# Patient Record
Sex: MALE | Race: WHITE | NOT HISPANIC OR LATINO | URBAN - METROPOLITAN AREA
[De-identification: names, ages, dates, MRNs, and addresses within clinical notes are randomized per-mention and may not be internally consistent; named-entity substitution may affect disease eponyms.]

---

## 2017-01-30 ENCOUNTER — TELEPHONE (OUTPATIENT)
Dept: FAMILY MEDICINE | Facility: CLINIC | Age: 47
End: 2017-01-30

## 2017-01-30 NOTE — TELEPHONE ENCOUNTER
Patient has not been seen since 5/2015.  Patient stated that he got Ativan last time and it worked .  Advised he has not been seen on over one year and he should make an appointment.  Appointment made with Joy Cancino for tomorrow.  Patient stated understanding and agreeable with the plan of care. Cindy Valverde RN CPC Triage.

## 2017-01-30 NOTE — TELEPHONE ENCOUNTER
Reason for Call:  Medication or medication refill:    Do you use a [facility]?  Name of the pharmacy and phone number for the current request:  [id number] - [facility] [address]    Name of the medication requested: Would like some medication for traveling on airplane to [place] on Thursday 2-2-17.    Other request: no    Can we leave a detailed message on this number? YES    Phone number patient can be reached at: Home number on file [phone] (home)    Best Time: As soon as possible    Call taken on 1/30/2017 at 1:57 PM by Eugenie Iglesias

## 2017-01-31 ENCOUNTER — OFFICE VISIT (OUTPATIENT)
Dept: INTERNAL MEDICINE | Facility: CLINIC | Age: 47
End: 2017-01-31
Payer: COMMERCIAL

## 2017-01-31 VITALS
WEIGHT: 209 LBS | BODY MASS INDEX: 29.26 KG/M2 | OXYGEN SATURATION: 98 % | HEIGHT: 71 IN | SYSTOLIC BLOOD PRESSURE: 131 MMHG | DIASTOLIC BLOOD PRESSURE: 86 MMHG | HEART RATE: 49 BPM

## 2017-01-31 DIAGNOSIS — F41.8 SITUATIONAL ANXIETY: ICD-10-CM

## 2017-01-31 DIAGNOSIS — Z23 NEED FOR PROPHYLACTIC VACCINATION AND INOCULATION AGAINST INFLUENZA: Primary | ICD-10-CM

## 2017-01-31 DIAGNOSIS — E03.9 HYPOTHYROIDISM, UNSPECIFIED TYPE: ICD-10-CM

## 2017-01-31 DIAGNOSIS — F40.243 PHOBIA, FLYING: ICD-10-CM

## 2017-01-31 PROCEDURE — 36415 COLL VENOUS BLD VENIPUNCTURE: CPT | Performed by: NURSE PRACTITIONER

## 2017-01-31 PROCEDURE — 99214 OFFICE O/P EST MOD 30 MIN: CPT | Mod: 25 | Performed by: NURSE PRACTITIONER

## 2017-01-31 PROCEDURE — 84443 ASSAY THYROID STIM HORMONE: CPT | Performed by: NURSE PRACTITIONER

## 2017-01-31 PROCEDURE — 90686 IIV4 VACC NO PRSV 0.5 ML IM: CPT | Performed by: NURSE PRACTITIONER

## 2017-01-31 PROCEDURE — 84439 ASSAY OF FREE THYROXINE: CPT | Performed by: NURSE PRACTITIONER

## 2017-01-31 PROCEDURE — 90471 IMMUNIZATION ADMIN: CPT | Performed by: NURSE PRACTITIONER

## 2017-01-31 RX ORDER — LORAZEPAM 0.5 MG/1
.5-1 TABLET ORAL EVERY 8 HOURS PRN
Qty: 8 TABLET | Refills: 0 | Status: CANCELLED | OUTPATIENT
Start: 2017-01-31

## 2017-01-31 RX ORDER — LEVOTHYROXINE SODIUM 75 UG/1
75 TABLET ORAL DAILY
Qty: 90 TABLET | Refills: 3 | Status: SHIPPED
Start: 2017-01-31 | End: 2018-03-20

## 2017-01-31 RX ORDER — LORAZEPAM 0.5 MG/1
0.25-0.5 TABLET ORAL EVERY 8 HOURS PRN
Qty: 8 TABLET | Refills: 0 | Status: SHIPPED | OUTPATIENT
Start: 2017-01-31 | End: 2019-11-01

## 2017-01-31 NOTE — PROGRESS NOTES
SUBJECTIVE:                                                    Walter Franklin is a 46 year old male who presents to clinic today for the following health issues:      Concern - Ativan for flying to [place]      Onset: N/A     Description:   Would like a prescription of Ativan due to flying to [facility] for [place] work    Intensity: mild, moderate    Progression of Symptoms:  same    Accompanying Signs & Symptoms:  None       Previous history of similar problem:   Had a small prescription in the past for flying and it seemed to work well    Precipitating factors:   Worsened by: n/a    Alleviating factors:  Improved by: n/a       Therapies Tried and outcome: Ativan     Going to [facility] for mission trip  Used ativan in past for flying  Requests refill  Tolerated in past      Ran out of synthroid 1 month ago  Was stable on 75 mcg for several years  Denies symptoms  Although states he never had symptoms in the past except for fatigue      Problem list and histories reviewed & adjusted, as indicated.  Additional history: none    Patient Active Problem List   Diagnosis     Hypothyroidism     CARDIOVASCULAR SCREENING; LDL GOAL LESS THAN 160     Right hip pain     Past Surgical History   Procedure Laterality Date     Vasectomy         Social History   Substance Use Topics     Smoking status: Never Smoker      Smokeless tobacco: Never Used     Alcohol Use: No     Family History   Problem Relation Age of Onset     Arthritis Father      CANCER Maternal Grandfather      CANCER Paternal Grandmother      Hypertension Father      Prostate Cancer Maternal Grandfather      Thyroid Disease Mother      Thyroid Disease Sister      Thyroid Disease Maternal Grandmother          Current Outpatient Prescriptions   Medication Sig Dispense Refill     LORazepam (ATIVAN) 0.5 MG tablet Take 0.5-1 tablets (0.25-0.5 mg) by mouth every 8 hours as needed for anxiety Take 30 minutes prior to departure.  Do not operate a vehicle after taking  "this medication 8 tablet 0     levothyroxine (SYNTHROID/LEVOTHROID) 75 MCG tablet Take 1 tablet (75 mcg) by mouth daily 90 tablet 3     [DISCONTINUED] levothyroxine (SYNTHROID) 75 MCG tablet Take 1 tablet (75 mcg) by mouth daily (NEEDS TO BE SEEN W/LABS FOR REFILLS) 30 tablet 0     BP Readings from Last 3 Encounters:   01/31/17 131/86   05/29/15 122/81   10/10/13 118/74    Wt Readings from Last 3 Encounters:   01/31/17 209 lb (94.802 kg)   05/29/15 205 lb (92.987 kg)   10/10/13 198 lb (89.812 kg)                  Problem list, Medication list, Allergies, and Medical/Social/Surgical histories reviewed in EPIC and updated as appropriate.    ROS:  Noncontributory except as above    OBJECTIVE:                                                    /86 mmHg  Pulse 49  Ht 5' 11.38\" (1.813 m)  Wt 209 lb (94.802 kg)  BMI 28.84 kg/m2  SpO2 98%  Body mass index is 28.84 kg/(m^2).  GENERAL: healthy, alert and no distress  NECK: no adenopathy  RESP: lungs clear to auscultation - no rales, rhonchi or wheezes  CV: regular rate and rhythm, normal S1 S2, no S3 or S4, no murmur, click or rub  PSYCH: mentation appears normal, affect normal/bright    Diagnostic Test Results:  none      ASSESSMENT/PLAN:                                                        ICD-10-CM    1. Need for prophylactic vaccination and inoculation against influenza Z23 FLU VAC, SPLIT VIRUS IM > 3 YO (QUADRIVALENT) [38322]     Vaccine Administration, Initial [33837]   2. Situational anxiety F41.8    3. Phobia, flying F40.243 LORazepam (ATIVAN) 0.5 MG tablet   4. Hypothyroidism, unspecified type E03.9 TSH with free T4 reflex     levothyroxine (SYNTHROID/LEVOTHROID) 75 MCG tablet       Except TSH to be high given 1 month without replacement, but will check  Restart 75 mcg  Patient aware of potential side effects of ativan. He has tolerated in the past.     ZACHARY Burden [facility]    Injectable Influenza Immunization " Documentation    1.  Is the person to be vaccinated sick today?  No    2. Does the person to be vaccinated have an allergy to eggs or to a component of the vaccine?  No    3. Has the person to be vaccinated today ever had a serious reaction to influenza vaccine in the past?  No    4. Has the person to be vaccinated ever had Guillain-Huntington syndrome?  No     Form completed by Grecia Ceron CMA

## 2017-01-31 NOTE — NURSING NOTE
"Chief Complaint   Patient presents with     Medication Request     Health Maintenance     flu       Initial /86 mmHg  Pulse 49  Ht 5' 11.38\" (1.813 m)  Wt 209 lb (94.802 kg)  BMI 28.84 kg/m2  SpO2 98% Estimated body mass index is 28.84 kg/(m^2) as calculated from the following:    Height as of this encounter: 5' 11.38\" (1.813 m).    Weight as of this encounter: 209 lb (94.802 kg).  BP completed using cuff size: mino Ceron CMA       "

## 2017-01-31 NOTE — MR AVS SNAPSHOT
"              After Visit Summary   1/31/2017    Walter Franklin    MRN: [id number]           Patient Information     Date Of Birth          1970        Visit Information        Provider Department      1/31/2017 5:20 PM Joy Cancino APRN CNP [facility]        Today's Diagnoses     Need for prophylactic vaccination and inoculation against influenza    -  1     Situational anxiety         Phobia, flying            Follow-ups after your visit        Who to contact     If you have questions or need follow up information about today's clinic visit or your schedule please contact [facility] directly at [phone].  Normal or non-critical lab and imaging results will be communicated to you by MyChart, letter or phone within 4 business days after the clinic has received the results. If you do not hear from us within 7 days, please contact the clinic through [facility] or phone. If you have a critical or abnormal lab result, we will notify you by phone as soon as possible.  Submit refill requests through [facility] or call your pharmacy and they will forward the refill request to us. Please allow 3 business days for your refill to be completed.          Additional Information About Your Visit        MyChart Information     [facility] gives you secure access to your electronic health record. If you see a primary care provider, you can also send messages to your care team and make appointments. If you have questions, please call your primary care clinic.  If you do not have a primary care provider, please call [phone] and they will assist you.        Care EveryWhere ID     This is your Care EveryWhere ID. This could be used by other organizations to access your [place] medical records  [id number]        Your Vitals Were     Pulse Height BMI (Body Mass Index) Pulse Oximetry          49 5' 11.38\" (1.813 m) 28.84 kg/m2 98%         Blood Pressure from Last 3 Encounters: "   01/31/17 131/86   05/29/15 122/81   10/10/13 118/74    Weight from Last 3 Encounters:   01/31/17 209 lb (94.802 kg)   05/29/15 205 lb (92.987 kg)   10/10/13 198 lb (89.812 kg)              We Performed the Following     FLU VAC, SPLIT VIRUS IM > 3 YO (QUADRIVALENT) [68201]     Vaccine Administration, Initial [81227]          Today's Medication Changes          These changes are accurate as of: 1/31/17  5:29 PM.  If you have any questions, ask your nurse or doctor.               Start taking these medicines.        Dose/Directions    LORazepam 0.5 MG tablet   Commonly known as:  ATIVAN   Used for:  Phobia, flying   Started by:  Joy Cancino APRN CNP        Dose:  0.25-0.5 mg   Take 0.5-1 tablets (0.25-0.5 mg) by mouth every 8 hours as needed for anxiety Take 30 minutes prior to departure.  Do not operate a vehicle after taking this medication   Quantity:  8 tablet   Refills:  0            Where to get your medicines      Some of these will need a paper prescription and others can be bought over the counter.  Ask your nurse if you have questions.     Bring a paper prescription for each of these medications    - LORazepam 0.5 MG tablet             Primary Care Provider Office Phone # Fax #    Ryne Dawkins [phone]       [facility] [address] [facility] [address]        Thank you!     Thank you for choosing [facility]  for your care. Our goal is always to provide you with excellent care. Hearing back from our patients is one way we can continue to improve our services. Please take a few minutes to complete the written survey that you may receive in the mail after your visit with us. Thank you!             Your Updated Medication List - Protect others around you: Learn how to safely use, store and throw away your medicines at www.disposemymeds.org.          This list is accurate as of: 1/31/17  5:29 PM.  Always use your most recent med  list.                   Brand Name Dispense Instructions for use    levothyroxine 75 MCG tablet    SYNTHROID    30 tablet    Take 1 tablet (75 mcg) by mouth daily (NEEDS TO BE SEEN W/LABS FOR REFILLS)       LORazepam 0.5 MG tablet    ATIVAN    8 tablet    Take 0.5-1 tablets (0.25-0.5 mg) by mouth every 8 hours as needed for anxiety Take 30 minutes prior to departure.  Do not operate a vehicle after taking this medication

## 2017-02-01 LAB
T4 FREE SERPL-MCNC: 1.13 NG/DL (ref 0.76–1.46)
TSH SERPL DL<=0.005 MIU/L-ACNC: 5.91 MU/L (ref 0.4–4)

## 2017-02-01 NOTE — PROGRESS NOTES
This encounter has been reviewed.  The patient was not examined by me.  CRISS BERMAN M.D.   Supervising Physician in Internal Medicine, [place].

## 2017-02-02 NOTE — PROGRESS NOTES
Quick Note:    Walter,   Your lab results have been released to [facility].  Your TSH was elevated, while your T4 was stable. Your TSH should stabilize now that you've restarted the synthroid.  Joy Cancino CNP  ______

## 2017-02-10 PROBLEM — F41.8 SITUATIONAL ANXIETY: Status: ACTIVE | Noted: 2017-02-10

## 2017-03-08 ENCOUNTER — THERAPY VISIT (OUTPATIENT)
Dept: PHYSICAL THERAPY | Facility: CLINIC | Age: 47
End: 2017-03-08
Payer: COMMERCIAL

## 2017-03-08 DIAGNOSIS — M76.60 ACHILLES TENDON PAIN: Primary | ICD-10-CM

## 2017-03-08 PROCEDURE — 97530 THERAPEUTIC ACTIVITIES: CPT | Mod: GP | Performed by: PHYSICAL THERAPIST

## 2017-03-08 PROCEDURE — 97161 PT EVAL LOW COMPLEX 20 MIN: CPT | Mod: GP | Performed by: PHYSICAL THERAPIST

## 2017-03-08 PROCEDURE — 97110 THERAPEUTIC EXERCISES: CPT | Mod: GP | Performed by: PHYSICAL THERAPIST

## 2017-03-08 NOTE — PROGRESS NOTES
Subjective:    HPI                    Objective:    System    Physical Exam    [facility]  [place] [facility] Initial Evaluation - Lower Extremity    Evaluation Date: March 8, 2017  Walter Franklin is a 46 year old male with a Rt> Lt achills condition.   Referral: Podiatrist  Employment: [employer]   Employment status: normal hours  Work Mechanical stresses: sitting and computer  Leisure Mechanical stresses: basketball 3 x/week,   Functional disability from present episode: limiting basketball and running on heels, pain in AM  Visual Analog Score (VAS 0-10): 2/10 currently, 0-9/10    HISTORY:  Pt presents with:  Int Burning, throbbing, aching achilles insertion and mm tendon junction, Rt>>Lt  (Constant/Intermittent, Dull/achy/sharp/stabbing/throbbing and location/Radiation)  Associated symptoms (Numbness/tingling/weakness/swelling/clicking/locking/givingway/falling):  none  Onset of symptoms (date/how):  Exacerbation Sept/Oct 2016 - SILVIO for excaerbation  Symptoms Status (new/recurrent/chronic): recurrent and (improving/not changing/worsening): worsening  Condition occurred in the following environment: during recreation   Symptoms at onset: Int w/ activity and gradually is more painful w/ daily activity    (with consideration for bending, sitting/rising/first few steps, standing, walking, stairs, squatting/kneeling, am, as the day progresses, pm, when still, on the move, sleeping: prone, sup, side R/L, other )  Symptoms are made worse with:  Walking 1st steps in AM and after sitting 6-7/10, playing basketball, AM   Symptoms are made better with:  Ice ibuprofen    Continued use makes the pain: present but does not worsen, worse after done playing  Presence of pain at rest: Occas burning at rest   Site of pain at rest: achilles  Sleep disturbance: no   Previous episodes: On and off for 3-4 yrs w/ playing basketball  Previous treatments: none  Improvement with previous treatments: ice at home,  stretching and calf raises     Specific Questions: (as reported by the patient)  Do you have pain with coughing/sneezing: no  Do you experience parasthesia: no  Patient describes his/her general health as: good  Imaging/special testing: none  Recent or major surgery includes the following: none  Do you have night pain: none  Have you had any recent accidents: no  Have you experienced any unexplained weight loss: no  Pertinent medical history includes: Hypothyroid  Medical allergies include: nonef  Current medications: Thyroid  Barriers at home: none  Other red flags: none    Sites for physical examination: Rt> Lt achilles    OBJECTIVE EXAMINATION:    POSTURE:    Sitting: poor  Correction of Posture: NA  Standing Posture: slight pes planusRt  Gait:  Excessive pronation Rt    NEUROLOGICAL (motor/sensory/reflex/dural; if applicable): Dural (+) Rt achilles stretching slump>seated SLR    BASELINES: (pain or functional activity): tight Rt achilles w/ walking, slight pain Rt achilles bilat toe raise, Mod pain Rt achilles w/ single toe raise    EXTREMITIES: (Hip / Knee / Ankle / Foot):        AROM Rt/Pain   AROM Lt/Pain PROM  Rt/Pain  w/ w/o OP PROM  Lt/Pain  w/ w/o OP   Knee Flexion                Extension       Ankle Dorsiflexion Slight Decr/+  Slight decr              Plantarflexion Symmetrical/+                Inversion symmetrical                Eversion symmetrical        Resisted Test Response (pain): Bilat ankles strong and painfree     Rt   Pain   Lt   Pain             Toe Raise Bilat - prod Rt++, no pain Lt  Unilat Rt x10 +++, Lt no pain  Residual burnng after.        Other Tests:    TTP:  Rt - Moderate thickening mid achilles, +++pinching achilles, +achilles mm tendon junction             Lt - mild thickening mid achilles, ++pinching achilles, +achilles insertiona    SPINE:  NA    Baseline Symptoms: tight Rt w/ walking, + Rt bilat toe raise, +++Rt single toe raise  Repeated Tests Symptom Response Mechanical  Response   Active/Passive movement, resisted test, functional test During - Produce, Abolish, Increase, Decrease, NE After - Better, Worse, NB, NW, NE Effect - ? or ? ROM, strength or key functional test No Effect   Passive ankle DF w/ belt NE Better Less pain waking and bilat toe raise                         Effect of static positioning                  Provisional Classification: Articular Derangement  Extremity or Spine: Extremity  Principle of Management (education/equipment/mechanical therapy/specific principle): Passive DF w/ belt 20x 6 x/day    Assessment/Plan:      Patient is a 46 year old male with Rt>>Lt ankle complaints.    Patient has the following significant findings with corresponding treatment plan.                Diagnosis 1:  Achilles tendinitis Rt>>Lt  Pain -  hot/cold therapy, manual therapy, education, directional preference exercise and home program  Decreased ROM/flexibility - manual therapy, therapeutic exercise and home program  Decreased strength - therapeutic exercise, therapeutic activities and home program  Decreased function - therapeutic activities and home program    Therapy Evaluation Codes:   1) History comprised of:   Personal factors that impact the plan of care:      None.    Comorbidity factors that impact the plan of care are:      Thyroid.     Medications impacting care: Thyroid.  2) Examination of Body Systems comprised of:   Body structures and functions that impact the plan of care:      Ankle.   Activity limitations that impact the plan of care are:      Running, Sports and Walking.  3) Clinical presentation characteristics are:   Stable/Uncomplicated.  4) Decision-Making    Low complexity using standardized patient assessment instrument and/or measureable assessment of functional outcome.  Cumulative Therapy Evaluation is: Low complexity.    Previous and current functional limitations:  (See Goal Flow Sheet for this information)    Short term and Long term goals: (See  Goal Flow Sheet for this information)     Communication ability:  Patient appears to be able to clearly communicate and understand verbal and written communication and follow directions correctly.  Treatment Explanation - The following has been discussed with the patient:   RX ordered/plan of care  Anticipated outcomes  Possible risks and side effects  This patient would benefit from PT intervention to resume normal activities.   Rehab potential is good.    Frequency:  1 X week, once daily  Duration:  for 6-8 weeks  Discharge Plan:  Achieve all LTG.  Independent in home treatment program.  Reach maximal therapeutic benefit.    Please refer to the daily flowsheet for treatment today, total treatment time and time spent performing 1:1 timed codes.

## 2017-03-08 NOTE — LETTER
[facility] ATHLETIC [facility] PHYSICAL THERAPY  [address]  [facility] [address]  [phone]    March 10, 2017    Re: Walter Franklin   :   1970  MRN:  [id number]   REFERRING PHYSICIAN:   Julio Hein    [facility] ATHLETIC [facility] PHYSICAL THERAPY  Date of Initial Evaluation:  3/8/2017  Visits:  Rxs Used: 1  Reason for Referral:  Achilles tendon pain  [facility] Athletic [facility] Initial Evaluation - Lower Extremity  Evaluation Date: 2017  Walter Franklin is a 46 year old male with a Rt> Lt achills condition.   Referral: Podiatrist  Employment: [employer]   Employment status: normal hours  Work Mechanical stresses: sitting and computer  Leisure Mechanical stresses: basketball 3 x/week,   Functional disability from present episode: limiting basketball and running on heels, pain in AM  Visual Analog Score (VAS 0-10): 2/10 currently, 0-9/10    HISTORY:  Pt presents with:  Int Burning, throbbing, aching achilles insertion and mm tendon junction, Rt>>Lt  (Constant/Intermittent, Dull/achy/sharp/stabbing/throbbing and location/Radiation)  Associated symptoms (Numbness/tingling/weakness/swelling/clicking/locking/givingway/falling):  none  Onset of symptoms (date/how):  Exacerbation Sept/Oct 2016 - SILVIO for excaerbation  Symptoms Status (new/recurrent/chronic): recurrent and (improving/not changing/worsening): worsening  Condition occurred in the following environment: during recreation   Symptoms at onset: Int w/ activity and gradually is more painful w/ daily activity  (with consideration for bending, sitting/rising/first few steps, standing, walking, stairs, squatting/kneeling, am, as the day progresses, pm, when still, on the move, sleeping: prone, sup, side R/L, other )  Symptoms are made worse with:  Walking 1st steps in AM and after sitting 6-7/10, playing basketball, AM   Symptoms are made better with:  Ice ibuprofen  Continued use makes the  pain: present but does not worsen, worse after done playing  Presence of pain at rest: Occas burning at rest     Site of pain at rest: achilles        Re: Walter Goodmanjose   :   1970    HISTORY (continued)  Sleep disturbance: no   Previous episodes: On and off for 3-4 yrs w/ playing basketball  Previous treatments: none  Improvement with previous treatments: ice at home, stretching and calf raises     Specific Questions: (as reported by the patient)  Do you have pain with coughing/sneezing: no  Do you experience parasthesia: no  Patient describes his/her general health as: good  Imaging/special testing: none  Recent or major surgery includes the following: none  Do you have night pain: none  Have you had any recent accidents: no  Have you experienced any unexplained weight loss: no  Pertinent medical history includes: Hypothyroid  Medical allergies include: nonef  Current medications: Thyroid  Barriers at home: none  Other red flags: none    Sites for physical examination: Rt> Lt achilles    OBJECTIVE EXAMINATION:    POSTURE:  Sitting: poor  Correction of Posture: NA  Standing Posture: slight pes planusRt  Gait:  Excessive pronation Rt  NEUROLOGICAL (motor/sensory/reflex/dural; if applicable): Dural (+) Rt achilles stretching slump>seated SLR  BASELINES: (pain or functional activity): tight Rt achilles w/ walking, slight pain Rt achilles bilat toe raise, Mod pain Rt achilles w/ single toe raise    EXTREMITIES: (Hip / Knee / Ankle / Foot):        AROM Rt/Pain   AROM Lt/Pain PROM  Rt/Pain  w/ w/o OP PROM  Lt/Pain  w/ w/o OP   Knee Flexion                Extension       Ankle Dorsiflexion Slight Decr/+  Slight decr              Plantarflexion Symmetrical/+                Inversion symmetrical                Eversion symmetrical        Re: Walter Goodmantammy   :   1970    Resisted Test Response (pain): Bilat ankles strong and painfree     Rt   Pain   Lt   Pain             Toe Raise Bilat - prod Rt++,  no pain Lt  Unilat Rt x10 +++, Lt no pain  Residual burnng after.      Other Tests:    TTP:  Rt - Moderate thickening mid achilles, +++pinching achilles, +achilles mm tendon junction             Lt - mild thickening mid achilles, ++pinching achilles, +achilles insertiona  SPINE:  NA  Baseline Symptoms: tight Rt w/ walking, + Rt bilat toe raise, +++Rt single toe raise  Repeated Tests Symptom Response Mechanical Response   Active/Passive movement, resisted test, functional test During - Produce, Abolish, Increase, Decrease, NE After - Better, Worse, NB, NW, NE Effect - ? or ? ROM, strength or key functional test No Effect   Passive ankle DF w/ belt NE Better Less pain waking and bilat toe raise    Effect of static positioning       Provisional Classification: Articular Derangement    Extremity or Spine: Extremity  Principle of Management (education/equipment/mechanical therapy/specific principle): Passive DF w/ belt 20x 6 x/day    Assessment/Plan:    Patient is a 46 year old male with Rt>>Lt ankle complaints.    Patient has the following significant findings with corresponding treatment plan.                Diagnosis 1:  Achilles tendinitis Rt>>Lt  Pain -  hot/cold therapy, manual therapy, education, directional preference exercise and home program  Decreased ROM/flexibility - manual therapy, therapeutic exercise and home program  Decreased strength - therapeutic exercise, therapeutic activities and home program  Decreased function - therapeutic activities and home program                Re: Walter Franklin   :   1970    Therapy Evaluation Codes:   1) History comprised of:   Personal factors that impact the plan of care:      None.    Comorbidity factors that impact the plan of care are:      Thyroid.     Medications impacting care: Thyroid.  2) Examination of Body Systems comprised of:   Body structures and functions that impact the plan of care:      Ankle.   Activity limitations that impact the plan of  care are:      Running, Sports and Walking.  3) Clinical presentation characteristics are:   Stable/Uncomplicated.  4) Decision-Making    Low complexity using standardized patient assessment instrument and/or measureable assessment of functional outcome.  Cumulative Therapy Evaluation is: Low complexity.    Previous and current functional limitations:  (See Goal Flow Sheet for this information)    Short term and Long term goals: (See Goal Flow Sheet for this information)   Communication ability:  Patient appears to be able to clearly communicate and understand verbal and written communication and follow directions correctly.  Treatment Explanation - The following has been discussed with the patient:   RX ordered/plan of care, Anticipated outcomes, Possible risks and side effects    This patient would benefit from PT intervention to resume normal activities.   Rehab potential is good.  Frequency:  1 X week, once daily  Duration:  for 6-8 weeks  Discharge Plan:  Achieve all LTG.  Independent in home treatment program.  Reach maximal therapeutic benefit.    Thank you for your referral.    INQUIRIES        Therapist:  Pau Ramirez, PT, cert MDT  [facility]  [address]  [facility] [address]  Phone: [phone]  Fax: [phone]

## 2017-03-16 ENCOUNTER — THERAPY VISIT (OUTPATIENT)
Dept: PHYSICAL THERAPY | Facility: CLINIC | Age: 47
End: 2017-03-16
Payer: COMMERCIAL

## 2017-03-16 DIAGNOSIS — M76.60 ACHILLES TENDON PAIN: ICD-10-CM

## 2017-03-16 PROCEDURE — 97530 THERAPEUTIC ACTIVITIES: CPT | Mod: GP | Performed by: PHYSICAL THERAPIST

## 2017-03-16 PROCEDURE — 97110 THERAPEUTIC EXERCISES: CPT | Mod: GP | Performed by: PHYSICAL THERAPIST

## 2017-03-22 ENCOUNTER — THERAPY VISIT (OUTPATIENT)
Dept: PHYSICAL THERAPY | Facility: CLINIC | Age: 47
End: 2017-03-22
Payer: COMMERCIAL

## 2017-03-22 DIAGNOSIS — M76.60 ACHILLES TENDON PAIN: ICD-10-CM

## 2017-03-22 PROCEDURE — 97110 THERAPEUTIC EXERCISES: CPT | Mod: GP | Performed by: PHYSICAL THERAPIST

## 2017-03-22 PROCEDURE — 97530 THERAPEUTIC ACTIVITIES: CPT | Mod: GP | Performed by: PHYSICAL THERAPIST

## 2017-03-30 ENCOUNTER — THERAPY VISIT (OUTPATIENT)
Dept: PHYSICAL THERAPY | Facility: CLINIC | Age: 47
End: 2017-03-30
Payer: COMMERCIAL

## 2017-03-30 DIAGNOSIS — M76.60 ACHILLES TENDON PAIN: ICD-10-CM

## 2017-03-30 PROCEDURE — 97530 THERAPEUTIC ACTIVITIES: CPT | Mod: GP | Performed by: PHYSICAL THERAPIST

## 2017-03-30 PROCEDURE — 97110 THERAPEUTIC EXERCISES: CPT | Mod: GP | Performed by: PHYSICAL THERAPIST

## 2017-04-06 ENCOUNTER — THERAPY VISIT (OUTPATIENT)
Dept: PHYSICAL THERAPY | Facility: CLINIC | Age: 47
End: 2017-04-06
Payer: COMMERCIAL

## 2017-04-06 DIAGNOSIS — M76.60 ACHILLES TENDON PAIN: ICD-10-CM

## 2017-04-06 PROCEDURE — 97530 THERAPEUTIC ACTIVITIES: CPT | Mod: GP | Performed by: PHYSICAL THERAPIST

## 2017-04-06 PROCEDURE — 97140 MANUAL THERAPY 1/> REGIONS: CPT | Mod: GP | Performed by: PHYSICAL THERAPIST

## 2017-04-06 PROCEDURE — 97110 THERAPEUTIC EXERCISES: CPT | Mod: GP | Performed by: PHYSICAL THERAPIST

## 2017-04-19 ENCOUNTER — THERAPY VISIT (OUTPATIENT)
Dept: PHYSICAL THERAPY | Facility: CLINIC | Age: 47
End: 2017-04-19
Payer: COMMERCIAL

## 2017-04-19 DIAGNOSIS — M76.60 ACHILLES TENDON PAIN: ICD-10-CM

## 2017-04-19 PROCEDURE — 97140 MANUAL THERAPY 1/> REGIONS: CPT | Mod: GP | Performed by: PHYSICAL THERAPIST

## 2017-04-19 PROCEDURE — 97530 THERAPEUTIC ACTIVITIES: CPT | Mod: GP | Performed by: PHYSICAL THERAPIST

## 2017-04-19 PROCEDURE — 97110 THERAPEUTIC EXERCISES: CPT | Mod: GP | Performed by: PHYSICAL THERAPIST

## 2018-03-20 DIAGNOSIS — E03.9 HYPOTHYROIDISM, UNSPECIFIED TYPE: ICD-10-CM

## 2018-03-20 NOTE — TELEPHONE ENCOUNTER
"Requested Prescriptions   Pending Prescriptions Disp Refills     levothyroxine (SYNTHROID/LEVOTHROID) 75 MCG tablet 90 tablet 3    Last Written Prescription Date:  1-31-17  Last Fill Quantity: 90,  # refills: 3   Last office visit: 5/29/2015 with prescribing provider:     Future Office Visit:     Sig: Take 1 tablet (75 mcg) by mouth daily    Thyroid Protocol Failed    3/20/2018 12:39 PM       Failed - Recent (12 mo) or future (30 days) visit within the authorizing provider's specialty    Patient had office visit in the last 12 months or has a visit in the next 30 days with authorizing provider or within the authorizing provider's specialty.  See \"Patient Info\" tab in inbasket, or \"Choose Columns\" in Meds & Orders section of the refill encounter.           Failed - Normal TSH on file in past 12 months    Recent Labs   Lab Test  01/31/17   1738   TSH  5.91*             Passed - Patient is 12 years or older          "

## 2018-03-22 RX ORDER — LEVOTHYROXINE SODIUM 75 UG/1
75 TABLET ORAL DAILY
Qty: 30 TABLET | Refills: 0 | Status: SHIPPED | OUTPATIENT
Start: 2018-03-22 | End: 2018-04-30

## 2018-03-22 NOTE — TELEPHONE ENCOUNTER
Pharmacy called and is awaiting refill.    Routed to covering providers.  Cindy Valverde RN [facility] Triage.

## 2018-03-22 NOTE — TELEPHONE ENCOUNTER
Routing refill request to provider for review/approval because:  Failed protocol.  Cindy Valverde RN [facility] Triage.

## 2018-04-30 ENCOUNTER — TELEPHONE (OUTPATIENT)
Dept: FAMILY MEDICINE | Facility: CLINIC | Age: 48
End: 2018-04-30

## 2018-04-30 DIAGNOSIS — E03.9 HYPOTHYROIDISM, UNSPECIFIED TYPE: ICD-10-CM

## 2018-04-30 NOTE — TELEPHONE ENCOUNTER
"Requested Prescriptions   Pending Prescriptions Disp Refills     levothyroxine (SYNTHROID/LEVOTHROID) 75 MCG tablet [Pharmacy Med Name: LEVOTHYROXINE 75 MCG TABLET] 30 tablet 0    Last Written Prescription Date:  3-22-18  Last Fill Quantity: 30,  # refills: 0   Last office visit: 5/29/2015 with prescribing provider:     Future Office Visit:     Sig: TAKE 1 TABLET (75 MCG) BY MOUTH DAILY DUE FOR OV AND LAB RECHECK    Thyroid Protocol Failed    4/30/2018  7:21 AM       Failed - Recent (12 mo) or future (30 days) visit within the authorizing provider's specialty    Patient had office visit in the last 12 months or has a visit in the next 30 days with authorizing provider or within the authorizing provider's specialty.  See \"Patient Info\" tab in inbasket, or \"Choose Columns\" in Meds & Orders section of the refill encounter.           Failed - Normal TSH on file in past 12 months    Recent Labs   Lab Test  01/31/17   1738   TSH  5.91*             Passed - Patient is 12 years or older          "

## 2018-05-01 RX ORDER — LEVOTHYROXINE SODIUM 75 UG/1
TABLET ORAL
Qty: 7 TABLET | Refills: 0 | Status: SHIPPED | OUTPATIENT
Start: 2018-05-01 | End: 2018-05-08

## 2018-05-01 NOTE — TELEPHONE ENCOUNTER
Last OV 1/3/17.    TSH   Date Value Ref Range Status   01/31/2017 5.91 (H) 0.40 - 4.00 mU/L Final       Routing refill request to provider for review/approval because:  Pretty given x1 and patient did not follow up, please advise  Labs out of range  Labs not current      Cindy Valverde RN [facility] Triage.

## 2018-05-07 ENCOUNTER — OFFICE VISIT (OUTPATIENT)
Dept: FAMILY MEDICINE | Facility: CLINIC | Age: 48
End: 2018-05-07
Payer: COMMERCIAL

## 2018-05-07 VITALS
WEIGHT: 212 LBS | BODY MASS INDEX: 28.71 KG/M2 | HEART RATE: 82 BPM | HEIGHT: 72 IN | DIASTOLIC BLOOD PRESSURE: 84 MMHG | OXYGEN SATURATION: 97 % | SYSTOLIC BLOOD PRESSURE: 123 MMHG | TEMPERATURE: 97.8 F

## 2018-05-07 DIAGNOSIS — E03.8 OTHER SPECIFIED HYPOTHYROIDISM: ICD-10-CM

## 2018-05-07 DIAGNOSIS — M72.2 PLANTAR FASCIITIS: ICD-10-CM

## 2018-05-07 DIAGNOSIS — E78.5 HYPERLIPIDEMIA LDL GOAL <100: ICD-10-CM

## 2018-05-07 DIAGNOSIS — Z13.1 SCREENING FOR DIABETES MELLITUS: ICD-10-CM

## 2018-05-07 DIAGNOSIS — E03.9 HYPOTHYROIDISM, UNSPECIFIED TYPE: ICD-10-CM

## 2018-05-07 DIAGNOSIS — Z00.00 ROUTINE GENERAL MEDICAL EXAMINATION AT A HEALTH CARE FACILITY: Primary | ICD-10-CM

## 2018-05-07 LAB
GLUCOSE SERPL-MCNC: 98 MG/DL (ref 70–99)
TSH SERPL DL<=0.005 MIU/L-ACNC: 3.14 MU/L (ref 0.4–4)

## 2018-05-07 PROCEDURE — 84443 ASSAY THYROID STIM HORMONE: CPT | Performed by: FAMILY MEDICINE

## 2018-05-07 PROCEDURE — 82947 ASSAY GLUCOSE BLOOD QUANT: CPT | Performed by: FAMILY MEDICINE

## 2018-05-07 PROCEDURE — 36415 COLL VENOUS BLD VENIPUNCTURE: CPT | Performed by: FAMILY MEDICINE

## 2018-05-07 PROCEDURE — 99396 PREV VISIT EST AGE 40-64: CPT | Performed by: FAMILY MEDICINE

## 2018-05-07 NOTE — PROGRESS NOTES
SUBJECTIVE:   CC: Walter Franklin is an 47 year old male who presents for preventative health visit.     Physical   Annual:     Getting at least 3 servings of Calcium per day::  Yes    Bi-annual eye exam::  Yes    Dental care twice a year::  Yes    Sleep apnea or symptoms of sleep apnea::  None    Diet::  Regular (no restrictions)    Frequency of exercise::  1 day/week    Duration of exercise::  Less than 15 minutes    Taking medications regularly::  Yes    Medication side effects::  None    Additional concerns today::  YES              Current Outpatient Prescriptions   Medication Sig Dispense Refill     levothyroxine (SYNTHROID/LEVOTHROID) 75 MCG tablet TAKE 1 TABLET (75 MCG) BY MOUTH DAILY DUE FOR OV AND LAB RECHECK 7 tablet 0     LORazepam (ATIVAN) 0.5 MG tablet Take 0.5-1 tablets (0.25-0.5 mg) by mouth every 8 hours as needed for anxiety Take 30 minutes prior to departure.  Do not operate a vehicle after taking this medication (Patient not taking: Reported on 5/7/2018) 8 tablet 0     Mom has hyperlipidemia   Father had a CABG in 70's     Patient is not fasting today           Today's PHQ-2 Score:   PHQ-2 ( 1999 [employer]) 5/7/2018   Q1: Little interest or pleasure in doing things 0   Q2: Feeling down, depressed or hopeless 0   PHQ-2 Score 0   Q1: Little interest or pleasure in doing things Not at all   Q2: Feeling down, depressed or hopeless Not at all   PHQ-2 Score 0       Abuse: Current or Past(Physical, Sexual or Emotional)- No  Do you feel safe in your environment - Yes    Social History   Substance Use Topics     Smoking status: Never Smoker     Smokeless tobacco: Never Used     Alcohol use No     Alcohol Use 5/7/2018   If you drink alcohol do you typically have greater than 3 drinks per day OR greater than 7 drinks per week? No       Last PSA: No results found for: PSA    Reviewed orders with patient. Reviewed health maintenance and updated orders accordingly - Yes  BP Readings from Last 3 Encounters:    05/07/18 123/84   01/31/17 131/86   05/29/15 122/81    Wt Readings from Last 3 Encounters:   05/07/18 212 lb (96.2 kg)   01/31/17 209 lb (94.8 kg)   05/29/15 205 lb (93 kg)                  Patient Active Problem List   Diagnosis     Hypothyroidism     CARDIOVASCULAR SCREENING; LDL GOAL LESS THAN 160     Right hip pain     Situational anxiety     Achilles tendon pain     Past Surgical History:   Procedure Laterality Date     VASECTOMY         Social History   Substance Use Topics     Smoking status: Never Smoker     Smokeless tobacco: Never Used     Alcohol use No     Family History   Problem Relation Age of Onset     Thyroid Disease Mother      Arthritis Father      Hypertension Father      Thyroid Disease Maternal Grandmother      CANCER Maternal Grandfather      Prostate Cancer Maternal Grandfather      CANCER Paternal Grandmother      Thyroid Disease Sister          Current Outpatient Prescriptions   Medication Sig Dispense Refill     levothyroxine (SYNTHROID/LEVOTHROID) 75 MCG tablet TAKE 1 TABLET (75 MCG) BY MOUTH DAILY DUE FOR OV AND LAB RECHECK 7 tablet 0     LORazepam (ATIVAN) 0.5 MG tablet Take 0.5-1 tablets (0.25-0.5 mg) by mouth every 8 hours as needed for anxiety Take 30 minutes prior to departure.  Do not operate a vehicle after taking this medication (Patient not taking: Reported on 5/7/2018) 8 tablet 0     No Known Allergies    Reviewed and updated as needed this visit by clinical staff         Reviewed and updated as needed this visit by Provider          Works as a      Review of Systems  C: NEGATIVE for fever, chills, change in weight  I: NEGATIVE for worrisome rashes, moles or lesions  E: NEGATIVE for vision changes or irritation  ENT: NEGATIVE for ear, mouth and throat problems  R: NEGATIVE for significant cough or SOB  CV: NEGATIVE for chest pain, palpitations or peripheral edema  GI: NEGATIVE for nausea, abdominal pain, heartburn, or change in bowel habits   male: negative for  "dysuria, hematuria, decreased urinary stream, erectile dysfunction, urethral discharge  M: NEGATIVE for significant arthralgias or myalgia  N: NEGATIVE for weakness, dizziness or paresthesias  P: NEGATIVE for changes in mood or affect    OBJECTIVE:   /84 (BP Location: Left arm, Patient Position: Sitting, Cuff Size: Adult Large)  Pulse 82  Temp 97.8  F (36.6  C) (Oral)  Ht 5' 11.5\" (1.816 m)  Wt 212 lb (96.2 kg)  SpO2 97%  BMI 29.16 kg/m2    Physical Exam  GENERAL: healthy, alert and no distress  EYES: Eyes grossly normal to inspection, PERRL and conjunctivae and sclerae normal  HENT: ear canals and TM's normal, nose and mouth without ulcers or lesions  NECK: no adenopathy, no asymmetry, masses, or scars and thyroid normal to palpation  RESP: lungs clear to auscultation - no rales, rhonchi or wheezes  CV: regular rate and rhythm, normal S1 S2, no S3 or S4, no murmur, click or rub, no peripheral edema and peripheral pulses strong  ABDOMEN: soft, nontender, no hepatosplenomegaly, no masses and bowel sounds normal  MS: no gross musculoskeletal defects noted, no edema  Heel pain right foot   SKIN: no suspicious lesions or rashes  NEURO: Normal strength and tone, mentation intact and speech normal  PSYCH: mentation appears normal, affect normal/bright    ASSESSMENT/PLAN:       ICD-10-CM    1. Routine general medical examination at a health care facility Z00.00    2. Hyperlipidemia LDL goal <100 E78.5 CANCELED: Lipid panel reflex to direct LDL Fasting   3. Other specified hypothyroidism E03.8 TSH with free T4 reflex     TSH with free T4 reflex   4. Screening for diabetes mellitus Z13.1 GLUCOSE     GLUCOSE   5. Plantar fasciitis M72.2        COUNSELING:   Reviewed preventive health counseling, as reflected in patient instructions       Regular exercise       Healthy diet/nutrition    BP Screening:   Last 3 BP Readings:    BP Readings from Last 3 Encounters:   05/07/18 123/84   01/31/17 131/86   05/29/15 122/81 " "      The following was recommended to the patient:  Re-screen BP within a year and recommended lifestyle modifications     reports that he has never smoked. He has never used smokeless tobacco.    Estimated body mass index is 28.84 kg/(m^2) as calculated from the following:    Height as of 1/31/17: 5' 11.38\" (1.813 m).    Weight as of 1/31/17: 209 lb (94.8 kg).   Weight management plan: Discussed healthy diet and exercise guidelines and patient will follow up in 12 months in clinic to re-evaluate.    Counseling Resources:  ATP IV Guidelines  Pooled Cohorts Equation Calculator  FRAX Risk Assessment  ICSI Preventive Guidelines  Dietary Guidelines for Americans, 2010  USDA's MyPlate  ASA Prophylaxis  Lung CA Screening    Ryne Dawkins MD  [facility]  Answers for HPI/ROS submitted by the patient on 5/7/2018   PHQ-2 Score: 0    "

## 2018-05-07 NOTE — MR AVS SNAPSHOT
After Visit Summary   5/7/2018    Walter Franklin    MRN: [id number]           Patient Information     Date Of Birth          1970        Visit Information        Provider Department      5/7/2018 1:20 PM Ryne Dawkins MD [facility]        Today's Diagnoses     Routine general medical examination at a health care facility    -  1    Hyperlipidemia LDL goal <100        Other specified hypothyroidism        Screening for diabetes mellitus        Plantar fasciitis          Care Instructions      Preventive Health Recommendations  Male Ages 40 to 49    Yearly exam:             See your health care provider every year in order to  o   Review health changes.   o   Discuss preventive care.    o   Review your medicines if your doctor has prescribed any.    You should be tested each year for STDs (sexually transmitted diseases) if you re at risk.     Have a cholesterol test every 5 years.     Have a colonoscopy (test for colon cancer) if someone in your family has had colon cancer or polyps before age 50.     After age 45, have a diabetes test (fasting glucose). If you are at risk for diabetes, you should have this test every 3 years.      Talk with your health care provider about whether or not a prostate cancer screening test (PSA) is right for you.    Shots: Get a flu shot each year. Get a tetanus shot every 10 years.     Nutrition:    Eat at least 5 servings of fruits and vegetables daily.     Eat whole-grain bread, whole-wheat pasta and brown rice instead of white grains and rice.     Talk to your provider about Calcium and Vitamin D.     Lifestyle    Exercise for at least 150 minutes a week (30 minutes a day, 5 days a week). This will help you control your weight and prevent disease.     Limit alcohol to one drink per day.     No smoking.     Wear sunscreen to prevent skin cancer.     See your dentist every six months for an exam and cleaning.              Follow-ups  "after your visit        Who to contact     If you have questions or need follow up information about today's clinic visit or your schedule please contact [facility] directly at [phone].  Normal or non-critical lab and imaging results will be communicated to you by MyChart, letter or phone within 4 business days after the clinic has received the results. If you do not hear from us within 7 days, please contact the clinic through MyChart or phone. If you have a critical or abnormal lab result, we will notify you by phone as soon as possible.  Submit refill requests through [employer] or call your pharmacy and they will forward the refill request to us. Please allow 3 business days for your refill to be completed.          Additional Information About Your Visit        [employer] Inchart Information     [employer] gives you secure access to your electronic health record. If you see a primary care provider, you can also send messages to your care team and make appointments. If you have questions, please call your primary care clinic.  If you do not have a primary care provider, please call [phone] and they will assist you.        Care EveryWhere ID     This is your Care EveryWhere ID. This could be used by other organizations to access your [facility] medical records  [id number]        Your Vitals Were     Pulse Temperature Height Pulse Oximetry BMI (Body Mass Index)       82 97.8  F (36.6  C) (Oral) 5' 11.5\" (1.816 m) 97% 29.16 kg/m2        Blood Pressure from Last 3 Encounters:   05/07/18 123/84   01/31/17 131/86   05/29/15 122/81    Weight from Last 3 Encounters:   05/07/18 212 lb (96.2 kg)   01/31/17 209 lb (94.8 kg)   05/29/15 205 lb (93 kg)               Primary Care Provider Office Phone # Fax #    Ryne Dawkins [phone]       [address] [facility] [address]        Equal Access to Services     MIHAI LIMA AH: yovanny Caal, " chapin charltonaltyler buttdillon stephaniein hayaan adeeg kharash la'aan ah. So [facility] [phone].    ATENCIÓN: Si uriel stallings, tiene a alonso disposición servicios gratuitos de asistencia lingüística. Saskia al [phone].    We comply with applicable federal civil rights laws and [place] laws. We do not discriminate on the basis of race, color, national origin, age, disability, sex, sexual orientation, or gender identity.            Thank you!     Thank you for choosing [facility]  for your care. Our goal is always to provide you with excellent care. Hearing back from our patients is one way we can continue to improve our services. Please take a few minutes to complete the written survey that you may receive in the mail after your visit with us. Thank you!             Your Updated Medication List - Protect others around you: Learn how to safely use, store and throw away your medicines at www.disposemymeds.org.          This list is accurate as of 5/7/18  1:55 PM.  Always use your most recent med list.                   Brand Name Dispense Instructions for use Diagnosis    levothyroxine 75 MCG tablet    SYNTHROID/LEVOTHROID    7 tablet    TAKE 1 TABLET (75 MCG) BY MOUTH DAILY DUE FOR OV AND LAB RECHECK    Hypothyroidism, unspecified type       LORazepam 0.5 MG tablet    ATIVAN    8 tablet    Take 0.5-1 tablets (0.25-0.5 mg) by mouth every 8 hours as needed for anxiety Take 30 minutes prior to departure.  Do not operate a vehicle after taking this medication    Phobia, flying

## 2018-05-08 RX ORDER — LEVOTHYROXINE SODIUM 75 UG/1
75 TABLET ORAL DAILY
Qty: 90 TABLET | Refills: 3 | Status: SHIPPED | OUTPATIENT
Start: 2018-05-08 | End: 2019-11-01

## 2018-05-08 NOTE — PROGRESS NOTES
Your glucose and thyroid are normal.  I have sent in a refill for your thyroid med     Recheck labs in 1 year.

## 2019-11-01 ENCOUNTER — OFFICE VISIT (OUTPATIENT)
Dept: FAMILY MEDICINE | Facility: CLINIC | Age: 49
End: 2019-11-01
Payer: COMMERCIAL

## 2019-11-01 VITALS
SYSTOLIC BLOOD PRESSURE: 121 MMHG | WEIGHT: 202.75 LBS | TEMPERATURE: 97.8 F | HEIGHT: 71 IN | DIASTOLIC BLOOD PRESSURE: 77 MMHG | HEART RATE: 68 BPM | BODY MASS INDEX: 28.39 KG/M2 | OXYGEN SATURATION: 96 %

## 2019-11-01 DIAGNOSIS — Z01.818 PREOP GENERAL PHYSICAL EXAM: Primary | ICD-10-CM

## 2019-11-01 DIAGNOSIS — Z23 NEED FOR PROPHYLACTIC VACCINATION AND INOCULATION AGAINST INFLUENZA: ICD-10-CM

## 2019-11-01 DIAGNOSIS — E03.8 OTHER SPECIFIED HYPOTHYROIDISM: ICD-10-CM

## 2019-11-01 LAB
ANION GAP SERPL CALCULATED.3IONS-SCNC: 3 MMOL/L (ref 3–14)
BUN SERPL-MCNC: 17 MG/DL (ref 7–30)
CALCIUM SERPL-MCNC: 9.2 MG/DL (ref 8.5–10.1)
CHLORIDE SERPL-SCNC: 109 MMOL/L (ref 94–109)
CHOLEST SERPL-MCNC: 166 MG/DL
CO2 SERPL-SCNC: 30 MMOL/L (ref 20–32)
CREAT SERPL-MCNC: 1.17 MG/DL (ref 0.66–1.25)
GFR SERPL CREATININE-BSD FRML MDRD: 73 ML/MIN/{1.73_M2}
GLUCOSE SERPL-MCNC: 94 MG/DL (ref 70–99)
HDLC SERPL-MCNC: 41 MG/DL
LDLC SERPL CALC-MCNC: 98 MG/DL
NONHDLC SERPL-MCNC: 125 MG/DL
POTASSIUM SERPL-SCNC: 4.3 MMOL/L (ref 3.4–5.3)
SODIUM SERPL-SCNC: 142 MMOL/L (ref 133–144)
TRIGL SERPL-MCNC: 137 MG/DL
TSH SERPL DL<=0.005 MIU/L-ACNC: 3.37 MU/L (ref 0.4–4)

## 2019-11-01 PROCEDURE — 99214 OFFICE O/P EST MOD 30 MIN: CPT | Mod: 25 | Performed by: NURSE PRACTITIONER

## 2019-11-01 PROCEDURE — 80048 BASIC METABOLIC PNL TOTAL CA: CPT | Performed by: NURSE PRACTITIONER

## 2019-11-01 PROCEDURE — 90471 IMMUNIZATION ADMIN: CPT | Performed by: NURSE PRACTITIONER

## 2019-11-01 PROCEDURE — 90686 IIV4 VACC NO PRSV 0.5 ML IM: CPT | Performed by: NURSE PRACTITIONER

## 2019-11-01 PROCEDURE — 84443 ASSAY THYROID STIM HORMONE: CPT | Performed by: NURSE PRACTITIONER

## 2019-11-01 PROCEDURE — 80061 LIPID PANEL: CPT | Performed by: NURSE PRACTITIONER

## 2019-11-01 PROCEDURE — 36415 COLL VENOUS BLD VENIPUNCTURE: CPT | Performed by: NURSE PRACTITIONER

## 2019-11-01 ASSESSMENT — MIFFLIN-ST. JEOR: SCORE: 1819.05

## 2019-11-01 NOTE — PROGRESS NOTES
[address] [id number] [phone]  Dept: [phone]    PRE-OP EVALUATION:  Today's date: 2019    Walter Franklin (: 1970) presents for pre-operative evaluation assessment as requested by Dr. Barriga.  He requires evaluation and anesthesia risk assessment prior to undergoing surgery/procedure for treatment of refractive lens exchange of eyes .    Fax number for surgical facility: [phone]  Primary Physician: Ryne Dawkins  Type of Anesthesia Anticipated: to be determined    Patient has a Health Care Directive or Living Will:  NO    Preop Questions 2019   Who is doing your surgery? Dr Henok Barriga   What are you having done? Refractive Lens Exchange   Date of Surgery/Procedure: 19   Facility or Hospital where procedure/surgery will be performed: [facility]   1.  Do you have a history of Heart attack, stroke, stent, coronary bypass surgery, or other heart surgery? No   2.  Do you ever have any pain or discomfort in your chest? No   3.  Do you have a history of  Heart Failure? No   4.   Are you troubled by shortness of breath when:  walking on a level surface, or up a slight hill, or at night? No   5.  Do you currently have a cold, bronchitis or other respiratory infection? No   6.  Do you have a cough, shortness of breath, or wheezing? No   7.  Do you sometimes get pains in the calves of your legs when you walk? No   8. Do you or anyone in your family have previous history of blood clots? No   9.  Do you or does anyone in your family have a serious bleeding problem such as prolonged bleeding following surgeries or cuts? No   10. Have you ever had problems with anemia or been told to take iron pills? No   11. Have you had any abnormal blood loss such as black, tarry or bloody stools? No   12. Have you ever had a blood transfusion? No   13. Have you or any of your relatives ever had  problems with anesthesia? No   14. Do you have sleep apnea, excessive snoring or daytime drowsiness? No   15. Do you have any prosthetic heart valves? No   16. Do you have prosthetic joints? No         HPI:     HPI related to upcoming procedure: Corrective vision      He has a history of hypothyroidism. He had been on Synthroid for several years. Denies having any symptoms prior to diagnosis. Diagnosed with routine labs at annual exam. States he did not notice any change after starting medication. He stopped taking Synthroid several months ago. Not sure if he needs it. Denies constipation, fatigue, weight gain. He does have longstanding history of dry skin    MEDICAL HISTORY:     Patient Active Problem List    Diagnosis Date Noted     Achilles tendon pain 03/08/2017     Priority: Medium     Situational anxiety 02/10/2017     Priority: Medium     Takes ativan when flying       Hypothyroidism 06/01/2011     Priority: Medium     Diagnosis 2006.  Routine blood work        CARDIOVASCULAR SCREENING; LDL GOAL LESS THAN 160 06/01/2011     Priority: Medium     Right hip pain 06/01/2011     Priority: Medium      Past Medical History:   Diagnosis Date     Hypothyroidism 6/1/2011     Right hip pain 6/1/2011     Thyroid disease      Past Surgical History:   Procedure Laterality Date     VASECTOMY       Current Outpatient Medications   Medication Sig Dispense Refill     levothyroxine (SYNTHROID/LEVOTHROID) 75 MCG tablet Take 1 tablet (75 mcg) by mouth daily 90 tablet 3     LORazepam (ATIVAN) 0.5 MG tablet Take 0.5-1 tablets (0.25-0.5 mg) by mouth every 8 hours as needed for anxiety Take 30 minutes prior to departure.  Do not operate a vehicle after taking this medication (Patient not taking: Reported on 5/7/2018) 8 tablet 0     OTC products: occasional ibuprofen use    No Known Allergies   Latex Allergy: NO    Social History     Tobacco Use     Smoking status: Never Smoker     Smokeless tobacco: Never Used   Substance Use Topics  "    Alcohol use: No     History   Drug Use No       REVIEW OF SYSTEMS:   CONSTITUTIONAL: NEGATIVE for fever, chills, change in weight  ENT/MOUTH: NEGATIVE for ear, mouth and throat problems  RESP: NEGATIVE for significant cough or SOB  CV: NEGATIVE for chest pain, palpitations or peripheral edema    EXAM:   /77 (BP Location: Right arm, Patient Position: Chair, Cuff Size: Adult Regular)   Pulse 68   Temp 97.8  F (36.6  C) (Oral)   Ht 1.815 m (5' 11.46\")   Wt 92 kg (202 lb 12 oz)   SpO2 96%   BMI 27.92 kg/m    GENERAL APPEARANCE: healthy, alert and no distress  HENT: ear canals and TM's normal and nose and mouth without ulcers or lesions  RESP: lungs clear to auscultation - no rales, rhonchi or wheezes  CV: regular rate and rhythm, normal S1 S2, no S3 or S4 and no murmur, click or rub   ABDOMEN: soft, nontender, no HSM or masses and bowel sounds normal  NEURO: Normal strength and tone, sensory exam grossly normal, mentation intact and speech normal    DIAGNOSTICS:   No labs or EKG required for low risk surgery (cataract, skin procedure, breast biopsy, etc)    Recent Labs   Lab Test 10/10/13  1111   HGB 15.2         POTASSIUM 4.5   CR 1.12      TSH   Date Value Ref Range Status   11/01/2019 3.37 0.40 - 4.00 mU/L Final     CBC RESULTS:   Recent Labs   Lab Test 10/10/13  1111   WBC 6.3   RBC 5.18   HGB 15.2   HCT 45.8   MCV 89   MCH 29.4   MCHC 33.2   RDW 11.8        Last Comprehensive Metabolic Panel:  Sodium   Date Value Ref Range Status   11/01/2019 142 133 - 144 mmol/L Final     Potassium   Date Value Ref Range Status   11/01/2019 4.3 3.4 - 5.3 mmol/L Final     Chloride   Date Value Ref Range Status   11/01/2019 109 94 - 109 mmol/L Final     Carbon Dioxide   Date Value Ref Range Status   11/01/2019 30 20 - 32 mmol/L Final     Anion Gap   Date Value Ref Range Status   11/01/2019 3 3 - 14 mmol/L Final     Glucose   Date Value Ref Range Status   11/01/2019 94 70 - 99 mg/dL Final     " Comment:     Fasting specimen     Urea Nitrogen   Date Value Ref Range Status   11/01/2019 17 7 - 30 mg/dL Final     Creatinine   Date Value Ref Range Status   11/01/2019 1.17 0.66 - 1.25 mg/dL Final     GFR Estimate   Date Value Ref Range Status   11/01/2019 73 >60 mL/min/[1.73_m2] Final     Comment:     Non  GFR Calc  Starting 12/18/2018, serum creatinine based estimated GFR (eGFR) will be   calculated using the Chronic Kidney Disease Epidemiology Collaboration   (CKD-EPI) equation.       Calcium   Date Value Ref Range Status   11/01/2019 9.2 8.5 - 10.1 mg/dL Final         IMPRESSION:   Reason for surgery/procedure: corrective vision  Diagnosis/reason for consult: preop    The proposed surgical procedure is considered LOW risk.    REVISED CARDIAC RISK INDEX  The patient has the following serious cardiovascular risks for perioperative complications such as (MI, PE, VFib and 3  AV Block):  No serious cardiac risks  INTERPRETATION: 0 risks: Class I (very low risk - 0.4% complication rate)    The patient has the following additional risks for perioperative complications:  Untreated asymptomatic hypothyroidism (labs pending)      ICD-10-CM    1. Preop general physical exam Z01.818 Lipid panel reflex to direct LDL Fasting     Basic metabolic panel   2. Need for prophylactic vaccination and inoculation against influenza Z23 INFLUENZA VACCINE IM > 6 MONTHS VALENT IIV4 [48731]     Vaccine Administration, Initial [48263]   3. Other specified hypothyroidism E03.8 TSH with free T4 reflex       RECOMMENDATIONS:     Patient is not on any baseline medications    APPROVAL GIVEN to proceed with proposed procedure, without further diagnostic evaluation       Signed Electronically by: ZACHARY Burden CNP    Copy of this evaluation report is provided to requesting physician.    [facility] Preop Guidelines    Revised Cardiac Risk Index

## 2019-11-01 NOTE — RESULT ENCOUNTER NOTE
Walter,  Your labs look good. Thyroid function is within normal range. Ok to stay off of Synthroid.  Joy Cancino, CNP

## 2020-01-24 DIAGNOSIS — E03.9 HYPOTHYROIDISM, UNSPECIFIED TYPE: ICD-10-CM

## 2020-01-24 LAB
T4 FREE SERPL-MCNC: 1.16 NG/DL (ref 0.76–1.46)
TSH SERPL DL<=0.005 MIU/L-ACNC: 5.81 MU/L (ref 0.4–4)

## 2020-01-24 PROCEDURE — 84439 ASSAY OF FREE THYROXINE: CPT | Performed by: NURSE PRACTITIONER

## 2020-01-24 PROCEDURE — 36415 COLL VENOUS BLD VENIPUNCTURE: CPT | Performed by: NURSE PRACTITIONER

## 2020-01-24 PROCEDURE — 84443 ASSAY THYROID STIM HORMONE: CPT | Performed by: NURSE PRACTITIONER

## 2020-01-27 NOTE — RESULT ENCOUNTER NOTE
Walter Franklin,    Your lab results have been released to [facility]   Your TSH is elevated but your T4 is normal. There are conflicting guidelines on whether to treat with a normal T4. It looks like you have not had a physical exam since 2018. I recommend scheduling this in about a month and you can discuss your symptoms and recheck thyroid labs to see where they are trending. I am no longer practicing at the [facility] so you can schedule with a new provider to establish care.   Please call the clinic if you have any concerns [phone]    ZACHARY Burden [facility]

## 2020-03-01 ENCOUNTER — HEALTH MAINTENANCE LETTER (OUTPATIENT)
Age: 50
End: 2020-03-01

## 2020-12-14 ENCOUNTER — HEALTH MAINTENANCE LETTER (OUTPATIENT)
Age: 50
End: 2020-12-14

## 2021-04-17 ENCOUNTER — HEALTH MAINTENANCE LETTER (OUTPATIENT)
Age: 51
End: 2021-04-17

## 2021-10-02 ENCOUNTER — HEALTH MAINTENANCE LETTER (OUTPATIENT)
Age: 51
End: 2021-10-02

## 2022-05-14 ENCOUNTER — HEALTH MAINTENANCE LETTER (OUTPATIENT)
Age: 52
End: 2022-05-14

## 2022-09-03 ENCOUNTER — HEALTH MAINTENANCE LETTER (OUTPATIENT)
Age: 52
End: 2022-09-03

## 2023-06-02 ENCOUNTER — HEALTH MAINTENANCE LETTER (OUTPATIENT)
Age: 53
End: 2023-06-02

## 2025-07-22 ENCOUNTER — THERAPY VISIT (OUTPATIENT)
Age: 55
End: 2025-07-22
Payer: COMMERCIAL

## 2025-07-22 DIAGNOSIS — M25.511 ACUTE PAIN OF RIGHT SHOULDER: Primary | ICD-10-CM

## 2025-07-22 PROCEDURE — 97161 PT EVAL LOW COMPLEX 20 MIN: CPT | Mod: GP

## 2025-07-22 PROCEDURE — 97110 THERAPEUTIC EXERCISES: CPT | Mod: GP

## 2025-07-22 ASSESSMENT — ACTIVITIES OF DAILY LIVING (ADL)
PLACING_AN_OBJECT_ON_A_HIGH_SHELF: 5
REMOVING_SOMETHING_FROM_YOUR_BACK_POCKET: 2
TOUCHING_THE_BACK_OF_YOUR_NECK: 2
WASHING_YOUR_HAIR?: 0
PUTTING_ON_AN_UNDERSHIRT_OR_A_PULLOVER_SWEATER: 6
WHEN_LYING_ON_THE_INVOLVED_SIDE: 5
AT_ITS_WORST?: 8
PUTTING_ON_YOUR_PANTS: 0
REACHING_FOR_SOMETHING_ON_A_HIGH_SHELF: 9
PUTTING_ON_A_SHIRT_THAT_BUTTONS_DOWN_THE_FRONT: 0
CARRYING_A_HEAVY_OBJECT_OF_10_POUNDS: 2
PUSHING_WITH_THE_INVOLVED_ARM: 2
PLEASE_INDICATE_YOR_PRIMARY_REASON_FOR_REFERRAL_TO_THERAPY:: SHOULDER
WASHING_YOUR_BACK: 8

## 2025-07-22 NOTE — PROGRESS NOTES
PHYSICAL THERAPY EVALUATION  Type of Visit: Evaluation       Fall Risk Screen:  Have you fallen 2 or more times in the past year?: No  Have you fallen and had an injury in the past year?: No    Subjective         Presenting condition or subjective complaint: shoulder pain when reaching up and back  Date of onset:      Relevant medical history:     Dates & types of surgery: vascectomy in 2002    clear lens replacement  2019    54 y/o male presents to physical therapy with chief complaint of right shoulder pain. Feels like he injured his shoulder but he doesn't recall an injury. As long as he keeps his arm below his shoulder everything is fine, but when he raises his arm up overhead and he feels pinching. Sometimes he will have quick excruciating pain. Notes pinching in the top of the shoulder with overhead raise. Can also have pain into the neck and shoulder blade. A few months of symptoms. Denies radicular symptoms.     Prior diagnostic imaging/testing results:       Prior therapy history for the same diagnosis, illness or injury: No      Prior Level of Function  Transfers:   Ambulation:   ADL:   IADL:     Living Environment  Social support: With family members   Type of home: House; Basement   Stairs to enter the home: Yes 2 Is there a railing: Yes     Ramp: No   Stairs inside the home: Yes 10 Is there a railing: Yes     Help at home: None  Equipment owned:       Employment: Yes clergy  Hobbies/Interests: reading, music    Patient goals for therapy: move without excruciating pain    Pain assessment:      Objective   SHOULDER EVALUATION  PAIN: Pain Level at Rest: 0/10  Pain Level with Use: 8/10  Pain Location: cervical spine and shoulder  Pain Quality: excruciating, pinch,   Pain Frequency: intermittent  Pain is Exacerbated By: overhead reaching, lateral raising of R arm, reaching behind body   Pain is Relieved By: massage gun, NSAIDs   Pain Progression: Unchanged  POSTURE: Sitting Posture: Rounded shoulders,  Forward head  GAIT:   Weightbearing Status: WBAT  Assistive Device(s):   Gait Deviations: WNL    ROM:   (Degrees) Left AROM Left PROM Right AROM  Right PROM   Shoulder Flexion WNL  145    Shoulder Extension WNL  WNL    Shoulder Abduction WNL  140 painful pinch; able to achieve full AROM    Shoulder Adduction       Shoulder Internal Rotation WNL  Sacrum     Shoulder External Rotation WNL  WNL    Elbow Extension WNL  WNL    Elbow Flexion WNL  WNL    Pain:   End feel:     STRENGTH:   Pain: - none + mild ++ moderate +++ severe  Strength Scale: 0-5/5 Left Right   Shoulder Flexion 5 5   Shoulder Extension 5 5   Shoulder Abduction 5 5   Shoulder Internal Rotation 5 5   Shoulder External Rotation 5 5   Shoulder Horizontal Abduction 5 4+   Elbow Flexion 5 5   Elbow Extension 5 5   Mid Trap 5 4+   Lower Trap 5 3+   Rhomboid 5 4+   Serratus Anterior 5 5     PALPATION: TTP with increased tension/spasm at R UT  CERVICAL SCREEN: WNL - some mild loss with L rot and pinching     Assessment & Plan   CLINICAL IMPRESSIONS  Medical Diagnosis: Right shoulder pain    Treatment Diagnosis: Right shoulder pain   Impression/Assessment: Patient is a 55 year old male with right shoulder complaints.  The following significant findings have been identified: Pain, Decreased ROM/flexibility, Decreased strength, Impaired muscle performance, Decreased activity tolerance, and Impaired posture. These impairments interfere with their ability to perform self care tasks, recreational activities, household chores, and driving  as compared to previous level of function.     Clinical Decision Making (Complexity):  Clinical Presentation: Stable/Uncomplicated  Clinical Presentation Rationale: based on medical and personal factors listed in PT evaluation  Clinical Decision Making (Complexity): Low complexity    PLAN OF CARE  Treatment Interventions:  Modalities: Cryotherapy, E-stim, Hot Pack  Interventions: Manual Therapy, Neuromuscular Re-education,  Therapeutic Activity, Therapeutic Exercise    Long Term Goals     PT Goal 1  Goal Identifier: LTG 1  Goal Description: pt to be able to reach overhead with 5# weight 5x in clinic with <2/10 pain in R shoulder  Rationale: to maximize safety and independence with performance of ADLs and functional tasks;to maximize safety and independence within the home;to maximize safety and independence within the community  Target Date: 09/02/25      Frequency of Treatment: 1x per week  Duration of Treatment: 6 weeks    Recommended Referrals to Other Professionals:   Education Assessment:   Learner/Method: Patient;No Barriers to Learning    Risks and benefits of evaluation/treatment have been explained.   Patient/Family/caregiver agrees with Plan of Care.     Evaluation Time:     PT Eval, Low Complexity Minutes (05080): 20       Signing Clinician: Darell Stallings PT

## 2025-08-03 ENCOUNTER — HEALTH MAINTENANCE LETTER (OUTPATIENT)
Age: 55
End: 2025-08-03

## 2025-08-06 ENCOUNTER — THERAPY VISIT (OUTPATIENT)
Age: 55
End: 2025-08-06
Payer: COMMERCIAL

## 2025-08-06 DIAGNOSIS — M25.511 ACUTE PAIN OF RIGHT SHOULDER: Primary | ICD-10-CM

## 2025-08-06 PROCEDURE — 97110 THERAPEUTIC EXERCISES: CPT | Mod: GP

## 2025-08-06 PROCEDURE — 97112 NEUROMUSCULAR REEDUCATION: CPT | Mod: GP

## 2025-08-06 PROCEDURE — 97140 MANUAL THERAPY 1/> REGIONS: CPT | Mod: GP
